# Patient Record
Sex: FEMALE | Race: WHITE | NOT HISPANIC OR LATINO | URBAN - METROPOLITAN AREA
[De-identification: names, ages, dates, MRNs, and addresses within clinical notes are randomized per-mention and may not be internally consistent; named-entity substitution may affect disease eponyms.]

---

## 2020-07-25 ENCOUNTER — TELEPHONE ENCOUNTER (OUTPATIENT)
Dept: URBAN - METROPOLITAN AREA CLINIC 13 | Facility: CLINIC | Age: 60
End: 2020-07-25

## 2020-07-26 ENCOUNTER — TELEPHONE ENCOUNTER (OUTPATIENT)
Dept: URBAN - METROPOLITAN AREA CLINIC 13 | Facility: CLINIC | Age: 60
End: 2020-07-26

## 2020-07-26 RX ORDER — LATANOPROST/PF 0.005 %
PLACE 1 DROP INTO AFFECTED EYE AT BEDTIME DROPS OPHTHALMIC (EYE)
Qty: 3 | Refills: 0 | Status: ACTIVE | COMMUNITY
Start: 2018-02-01

## 2020-07-26 RX ORDER — DICLOFENAC SODIUM 1 %
APPLY SPARINGLY TO AFFECTED AREA(S) ONCE DAILY GEL (GRAM) TOPICAL
Refills: 0 | Status: ACTIVE | COMMUNITY

## 2020-07-26 RX ORDER — ATORVASTATIN CALCIUM 20 MG/1
TAKE 1 TABLET DAILY TABLET, FILM COATED ORAL
Refills: 0 | Status: ACTIVE | COMMUNITY

## 2020-07-26 RX ORDER — LISINOPRIL AND HYDROCHLOROTHIAZIDE TABLETS 10; 12.5 MG/1; MG/1
TAKE 1 TABLET DAILY TABLET ORAL
Refills: 0 | Status: ACTIVE | COMMUNITY

## 2020-07-26 RX ORDER — PANTOPRAZOLE SODIUM 40 MG
TAKE 1 TABLET DAILY TABLET, DELAYED RELEASE (ENTERIC COATED) ORAL
Refills: 3 | Status: ACTIVE | COMMUNITY

## 2020-11-11 ENCOUNTER — TELEPHONE ENCOUNTER (OUTPATIENT)
Dept: URBAN - METROPOLITAN AREA CLINIC 113 | Facility: CLINIC | Age: 60
End: 2020-11-11

## 2020-11-11 VITALS — HEIGHT: 62 IN | BODY MASS INDEX: 34.23 KG/M2 | WEIGHT: 186 LBS

## 2020-11-11 RX ORDER — LATANOPROST/PF 0.005 %
PLACE 1 DROP INTO AFFECTED EYE AT BEDTIME DROPS OPHTHALMIC (EYE)
Qty: 3 | Refills: 0 | Status: ACTIVE | COMMUNITY
Start: 2018-02-01

## 2020-11-11 RX ORDER — ATORVASTATIN CALCIUM 20 MG/1
TAKE 1 TABLET DAILY TABLET, FILM COATED ORAL
Refills: 0 | Status: ACTIVE | COMMUNITY

## 2020-11-11 RX ORDER — SODIUM, POTASSIUM,MAG SULFATES 17.5-3.13G
354 ML SOLUTION, RECONSTITUTED, ORAL ORAL ONCE
Qty: 354 ML | Refills: 0 | OUTPATIENT

## 2020-11-11 RX ORDER — DICLOFENAC SODIUM 1 %
APPLY SPARINGLY TO AFFECTED AREA(S) ONCE DAILY GEL (GRAM) TOPICAL
Refills: 0 | Status: ACTIVE | COMMUNITY

## 2020-11-11 RX ORDER — PANTOPRAZOLE SODIUM 40 MG
TAKE 1 TABLET DAILY TABLET, DELAYED RELEASE (ENTERIC COATED) ORAL
Refills: 3 | Status: ACTIVE | COMMUNITY

## 2020-11-11 RX ORDER — LISINOPRIL AND HYDROCHLOROTHIAZIDE TABLETS 10; 12.5 MG/1; MG/1
TAKE 1 TABLET DAILY TABLET ORAL
Refills: 0 | Status: ACTIVE | COMMUNITY

## 2020-11-13 ENCOUNTER — WEB ENCOUNTER (OUTPATIENT)
Dept: URBAN - METROPOLITAN AREA CLINIC 113 | Facility: CLINIC | Age: 60
End: 2020-11-13

## 2020-11-13 RX ORDER — PANTOPRAZOLE SODIUM 40 MG
1 TABLET TABLET, DELAYED RELEASE (ENTERIC COATED) ORAL TWICE A DAY
Qty: 60 TABLET | Refills: 3

## 2020-11-16 ENCOUNTER — TELEPHONE ENCOUNTER (OUTPATIENT)
Dept: URBAN - METROPOLITAN AREA CLINIC 113 | Facility: CLINIC | Age: 60
End: 2020-11-16

## 2020-11-17 ENCOUNTER — OFFICE VISIT (OUTPATIENT)
Dept: URBAN - METROPOLITAN AREA SURGERY CENTER 25 | Facility: SURGERY CENTER | Age: 60
End: 2020-11-17
Payer: COMMERCIAL

## 2020-11-17 ENCOUNTER — CLAIMS CREATED FROM THE CLAIM WINDOW (OUTPATIENT)
Dept: URBAN - METROPOLITAN AREA CLINIC 4 | Facility: CLINIC | Age: 60
End: 2020-11-17
Payer: COMMERCIAL

## 2020-11-17 DIAGNOSIS — K55.059 ACUTE (REVERSIBLE) ISCHEMIA OF INTESTINE, PART AND EXTENT UNSPECIFIED: ICD-10-CM

## 2020-11-17 DIAGNOSIS — K63.89 BACTERIAL OVERGROWTH SYNDROME: ICD-10-CM

## 2020-11-17 DIAGNOSIS — Z86.010 H/O ADENOMATOUS POLYP OF COLON: ICD-10-CM

## 2020-11-17 PROCEDURE — G8907 PT DOC NO EVENTS ON DISCHARG: HCPCS | Performed by: INTERNAL MEDICINE

## 2020-11-17 PROCEDURE — G9936 PMH PLYP/NEO CO/RECT/JUN/ANS: HCPCS | Performed by: INTERNAL MEDICINE

## 2020-11-17 PROCEDURE — 88305 TISSUE EXAM BY PATHOLOGIST: CPT | Performed by: PATHOLOGY

## 2020-11-17 PROCEDURE — 45380 COLONOSCOPY AND BIOPSY: CPT | Performed by: INTERNAL MEDICINE

## 2020-11-17 RX ORDER — LATANOPROST/PF 0.005 %
PLACE 1 DROP INTO AFFECTED EYE AT BEDTIME DROPS OPHTHALMIC (EYE)
Qty: 3 | Refills: 0 | Status: ACTIVE | COMMUNITY
Start: 2018-02-01

## 2020-11-17 RX ORDER — PANTOPRAZOLE SODIUM 40 MG
TAKE 1 TABLET DAILY TABLET, DELAYED RELEASE (ENTERIC COATED) ORAL
Refills: 3 | Status: ACTIVE | COMMUNITY

## 2020-11-17 RX ORDER — LISINOPRIL AND HYDROCHLOROTHIAZIDE TABLETS 10; 12.5 MG/1; MG/1
TAKE 1 TABLET DAILY TABLET ORAL
Refills: 0 | Status: ACTIVE | COMMUNITY

## 2020-11-17 RX ORDER — DICLOFENAC SODIUM 1 %
APPLY SPARINGLY TO AFFECTED AREA(S) ONCE DAILY GEL (GRAM) TOPICAL
Refills: 0 | Status: ACTIVE | COMMUNITY

## 2020-11-17 RX ORDER — SODIUM, POTASSIUM,MAG SULFATES 17.5-3.13G
354 ML SOLUTION, RECONSTITUTED, ORAL ORAL ONCE
Qty: 354 ML | Refills: 0 | Status: ACTIVE | COMMUNITY

## 2020-11-17 RX ORDER — ATORVASTATIN CALCIUM 20 MG/1
TAKE 1 TABLET DAILY TABLET, FILM COATED ORAL
Refills: 0 | Status: ACTIVE | COMMUNITY

## 2020-12-04 ENCOUNTER — OFFICE VISIT (OUTPATIENT)
Dept: URBAN - METROPOLITAN AREA CLINIC 113 | Facility: CLINIC | Age: 60
End: 2020-12-04
Payer: COMMERCIAL

## 2020-12-04 ENCOUNTER — WEB ENCOUNTER (OUTPATIENT)
Dept: URBAN - METROPOLITAN AREA CLINIC 113 | Facility: CLINIC | Age: 60
End: 2020-12-04

## 2020-12-04 ENCOUNTER — LAB OUTSIDE AN ENCOUNTER (OUTPATIENT)
Dept: URBAN - METROPOLITAN AREA CLINIC 113 | Facility: CLINIC | Age: 60
End: 2020-12-04

## 2020-12-04 VITALS
SYSTOLIC BLOOD PRESSURE: 130 MMHG | DIASTOLIC BLOOD PRESSURE: 80 MMHG | RESPIRATION RATE: 18 BRPM | HEART RATE: 74 BPM | WEIGHT: 187 LBS | TEMPERATURE: 91.9 F | BODY MASS INDEX: 34.41 KG/M2 | HEIGHT: 62 IN

## 2020-12-04 DIAGNOSIS — D12.6 ADENOMATOUS POLYP OF COLON, UNSPECIFIED PART OF COLON: ICD-10-CM

## 2020-12-04 DIAGNOSIS — K21.9 GASTROESOPHAGEAL REFLUX DISEASE, UNSPECIFIED WHETHER ESOPHAGITIS PRESENT: ICD-10-CM

## 2020-12-04 DIAGNOSIS — K57.30 COLON, DIVERTICULOSIS: ICD-10-CM

## 2020-12-04 PROCEDURE — 99214 OFFICE O/P EST MOD 30 MIN: CPT | Performed by: NURSE PRACTITIONER

## 2020-12-04 PROCEDURE — G8427 DOCREV CUR MEDS BY ELIG CLIN: HCPCS | Performed by: NURSE PRACTITIONER

## 2020-12-04 RX ORDER — PANTOPRAZOLE SODIUM 40 MG
1 TABLET TABLET, DELAYED RELEASE (ENTERIC COATED) ORAL TWICE A DAY
Refills: 3 | Status: ACTIVE | COMMUNITY

## 2020-12-04 RX ORDER — ATORVASTATIN CALCIUM 20 MG/1
TAKE 1 TABLET DAILY TABLET, FILM COATED ORAL
Refills: 0 | Status: ACTIVE | COMMUNITY

## 2020-12-04 RX ORDER — LISINOPRIL AND HYDROCHLOROTHIAZIDE TABLETS 10; 12.5 MG/1; MG/1
TAKE 1 TABLET DAILY TABLET ORAL
Refills: 0 | Status: ACTIVE | COMMUNITY

## 2020-12-04 NOTE — HPI-TODAY'S VISIT:
This is a 60-year-old female with a history of hypertension, hyperlipidemia, GERD, and colon polyps presenting for follow-up after a surveillance colonoscopy.   She is taking pantoprazole 40 mg twice a day.  She is experiencing breakthrough heartburn 5 days/week.  Occasionally, this occurs during the day and is diet dependent and she has occasional nocturnal symptoms during which she wakes feeling reflux causing her to cough and choke.  She had an EGD 5 or 6 years ago.  She cannot recall the results.  Her brother  of esophageal cancer.  She denies constipation, red blood per rectum, or any other abdominal symptoms. Colonoscopy 2020 : BB PS 9, grade 2 nonbleeding internal hemorrhoids, sigmoid and descending diverticulosis, localized area of erythematous mucosa in the sigmoid colon status post biopsy, otherwise normal exam.  Sigmoid biopsy demonstrated ischemic-like changes.  Due to her prior history of polyps, a surveillance colonoscopy is recommended in .

## 2020-12-04 NOTE — HPI-OTHER HISTORIES
Colonoscopy 11/17/2020 : BBPS 9, grade 2 nonbleeding internal hemorrhoids, sigmoid and descending diverticulosis, localized area of erythematous mucosa in the sigmoid colon status post biopsy, otherwise normal exam.  Sigmoid biopsy demonstrated ischemic-like changes.  Due to her prior history of polyps, a surveillance colonoscopy is recommended in 2025.

## 2020-12-23 ENCOUNTER — OFFICE VISIT (OUTPATIENT)
Dept: URBAN - METROPOLITAN AREA SURGERY CENTER 25 | Facility: SURGERY CENTER | Age: 60
End: 2020-12-23
Payer: COMMERCIAL

## 2020-12-23 ENCOUNTER — CLAIMS CREATED FROM THE CLAIM WINDOW (OUTPATIENT)
Dept: URBAN - METROPOLITAN AREA CLINIC 4 | Facility: CLINIC | Age: 60
End: 2020-12-23
Payer: COMMERCIAL

## 2020-12-23 DIAGNOSIS — K31.89 ACQUIRED DEFORMITY OF DUODENUM: ICD-10-CM

## 2020-12-23 DIAGNOSIS — K22.2 SCHATZKI'S RING: ICD-10-CM

## 2020-12-23 DIAGNOSIS — K29.60 OTHER GASTRITIS WITHOUT BLEEDING: ICD-10-CM

## 2020-12-23 PROCEDURE — G8907 PT DOC NO EVENTS ON DISCHARG: HCPCS | Performed by: INTERNAL MEDICINE

## 2020-12-23 PROCEDURE — 88312 SPECIAL STAINS GROUP 1: CPT | Performed by: PATHOLOGY

## 2020-12-23 PROCEDURE — 88305 TISSUE EXAM BY PATHOLOGIST: CPT | Performed by: PATHOLOGY

## 2020-12-23 PROCEDURE — 43239 EGD BIOPSY SINGLE/MULTIPLE: CPT | Performed by: INTERNAL MEDICINE

## 2020-12-23 RX ORDER — PANTOPRAZOLE SODIUM 40 MG
1 TABLET TABLET, DELAYED RELEASE (ENTERIC COATED) ORAL TWICE A DAY
Refills: 3 | Status: ACTIVE | COMMUNITY

## 2020-12-23 RX ORDER — LISINOPRIL AND HYDROCHLOROTHIAZIDE TABLETS 10; 12.5 MG/1; MG/1
TAKE 1 TABLET DAILY TABLET ORAL
Refills: 0 | Status: ACTIVE | COMMUNITY

## 2020-12-23 RX ORDER — ATORVASTATIN CALCIUM 20 MG/1
TAKE 1 TABLET DAILY TABLET, FILM COATED ORAL
Refills: 0 | Status: ACTIVE | COMMUNITY

## 2020-12-28 ENCOUNTER — OFFICE VISIT (OUTPATIENT)
Dept: URBAN - METROPOLITAN AREA SURGERY CENTER 25 | Facility: SURGERY CENTER | Age: 60
End: 2020-12-28

## 2021-02-15 ENCOUNTER — DASHBOARD ENCOUNTERS (OUTPATIENT)
Age: 61
End: 2021-02-15

## 2021-02-15 ENCOUNTER — OFFICE VISIT (OUTPATIENT)
Dept: URBAN - METROPOLITAN AREA CLINIC 113 | Facility: CLINIC | Age: 61
End: 2021-02-15
Payer: COMMERCIAL

## 2021-02-15 ENCOUNTER — WEB ENCOUNTER (OUTPATIENT)
Dept: URBAN - METROPOLITAN AREA CLINIC 113 | Facility: CLINIC | Age: 61
End: 2021-02-15

## 2021-02-15 VITALS
HEIGHT: 62 IN | DIASTOLIC BLOOD PRESSURE: 70 MMHG | SYSTOLIC BLOOD PRESSURE: 121 MMHG | WEIGHT: 191 LBS | TEMPERATURE: 98 F | HEART RATE: 77 BPM | BODY MASS INDEX: 35.15 KG/M2

## 2021-02-15 DIAGNOSIS — D12.6 ADENOMATOUS POLYP OF COLON, UNSPECIFIED PART OF COLON: ICD-10-CM

## 2021-02-15 DIAGNOSIS — K29.60 OTHER GASTRITIS WITHOUT BLEEDING: ICD-10-CM

## 2021-02-15 DIAGNOSIS — K22.2 SCHATZKI'S RING: ICD-10-CM

## 2021-02-15 DIAGNOSIS — K44.9 HIATAL HERNIA: ICD-10-CM

## 2021-02-15 DIAGNOSIS — K21.9 GASTROESOPHAGEAL REFLUX DISEASE, UNSPECIFIED WHETHER ESOPHAGITIS PRESENT: ICD-10-CM

## 2021-02-15 DIAGNOSIS — K57.30 COLON, DIVERTICULOSIS: ICD-10-CM

## 2021-02-15 PROBLEM — 733657002: Status: ACTIVE | Noted: 2020-12-08

## 2021-02-15 PROBLEM — 235623002: Status: ACTIVE | Noted: 2021-02-15

## 2021-02-15 PROBLEM — 428054006: Status: ACTIVE | Noted: 2020-11-12

## 2021-02-15 PROBLEM — 235595009: Status: ACTIVE | Noted: 2020-12-04

## 2021-02-15 PROCEDURE — 99213 OFFICE O/P EST LOW 20 MIN: CPT | Performed by: INTERNAL MEDICINE

## 2021-02-15 RX ORDER — PANTOPRAZOLE SODIUM 40 MG
1 TABLET TABLET, DELAYED RELEASE (ENTERIC COATED) ORAL TWICE A DAY
Refills: 3 | Status: ACTIVE | COMMUNITY

## 2021-02-15 RX ORDER — ATORVASTATIN CALCIUM 20 MG/1
TAKE 1 TABLET DAILY TABLET, FILM COATED ORAL
Refills: 0 | Status: ACTIVE | COMMUNITY

## 2021-02-15 RX ORDER — UBIDECARENONE 30 MG
AS DIRECTED CAPSULE ORAL
Status: ACTIVE | COMMUNITY

## 2021-02-15 RX ORDER — LISINOPRIL AND HYDROCHLOROTHIAZIDE TABLETS 10; 12.5 MG/1; MG/1
TAKE 1 TABLET DAILY TABLET ORAL
Refills: 0 | Status: ACTIVE | COMMUNITY

## 2021-02-15 NOTE — HPI-TODAY'S VISIT:
59 y/o female presenting for f/u. She was last seen in the clinic in Dec. 2020. She has had an EGD performed in Dec 2020 and was found to have a widely patent Schatzki's ring, a 3cm hiatal hernia, and gastritis/duodenitis. Bx were negative for h.pylori/metaplasia. She is here today for f/u.  She has been doing well on PPI daily without any breakthrough.   2020 This is a 60-year-old female with a history of hypertension, hyperlipidemia, GERD, and colon polyps presenting for follow-up after a surveillance colonoscopy.   She is taking pantoprazole 40 mg twice a day.  She is experiencing breakthrough heartburn 5 days/week.  Occasionally, this occurs during the day and is diet dependent and she has occasional nocturnal symptoms during which she wakes feeling reflux causing her to cough and choke.  She had an EGD 5 or 6 years ago.  She cannot recall the results.  Her brother  of esophageal cancer.  She denies constipation, red blood per rectum, or any other abdominal symptoms. Colonoscopy 2020 : BB PS 9, grade 2 nonbleeding internal hemorrhoids, sigmoid and descending diverticulosis, localized area of erythematous mucosa in the sigmoid colon status post biopsy, otherwise normal exam.  Sigmoid biopsy demonstrated ischemic-like changes.  Due to her prior history of polyps, a surveillance colonoscopy is recommended in .